# Patient Record
Sex: FEMALE | Race: BLACK OR AFRICAN AMERICAN | NOT HISPANIC OR LATINO | URBAN - METROPOLITAN AREA
[De-identification: names, ages, dates, MRNs, and addresses within clinical notes are randomized per-mention and may not be internally consistent; named-entity substitution may affect disease eponyms.]

---

## 2017-01-13 ENCOUNTER — TELEPHONE (OUTPATIENT)
Dept: FAMILY MEDICINE | Facility: CLINIC | Age: 42
End: 2017-01-13

## 2017-01-13 NOTE — TELEPHONE ENCOUNTER
Panel Management Review      Patient has the following on her problem list: None      Composite cancer screening  Chart review shows that this patient is due/due soon for the following Pap Smear  Summary:    Patient is due/failing the following:   PAP    Action needed:   Patient needs office visit for physical with pap.    Type of outreach:    Phone, left message for patient to call back.  and Sent letter.    Questions for provider review:    None                                                                                                                                    Gregoria Brown MA  9:17 AM 1/13/2017       Chart routed to none .

## 2017-01-13 NOTE — Clinical Note
[address] [address]  [phone]  Dept: [phone]      January 13, 2017      Saba Mackay  [address]    [facility] [address]      Dear Saba Mackay,     At [facility] we care about your health and are committed to providing quality patient care.    Which includes staying current on preventive cancer screenings.  You can increase your chances of finding and treating cancers through regular screenings.      Our records indicate you may be due for the following preventive screening(s):    Cervical Cancer Screening    Pap smear is a screening test used to detect cervical cancer. It is recommended every three years for women 21 and older. The test should be done at least once every three years but women who are at greater risk for cervical cancer may need to have the test more often.    To schedule an appointment or discuss this screening further, you may contact us by phone at the [facility] at [phone] or online through the patient [email or web address]    If you have had any of the screenings listed above at another facility, please call us so that we may update your chart.      Your partners in health,      Quality Committee at [facility]/harm[employer]

## 2017-05-16 ENCOUNTER — TELEPHONE (OUTPATIENT)
Dept: FAMILY MEDICINE | Facility: CLINIC | Age: 42
End: 2017-05-16

## 2017-05-16 NOTE — LETTER
[facility]       [address]  [place] [address]      May 16, 2017      Saba Mackay  [address]    [facility] [id number]    Dear Saba Mackay, [phone]    At [facility] we care about your health and are committed to providing quality patient care, which includes staying current on preventative cancer screenings.  You can increase your chances of finding and treating cancers through regular screenings.      Our records show that you are due for the following screening(s):    Pap Smear for cervical cancer  Recommended every three years for women 21 and older  A Pap test is used to detect cervical cancer.  The test should be taken at least once every three years but women who are at a greater risk for cervical cancer may need to have the test more often.      You are at a greater risk for cervical cancer if:   - You have had a sexually transmitted disease   - You have had more than one sex partner   - You have had an abnormal pap test in the past    You may contact the [facility] at [phone] to schedule the screening test(s) at your earliest convenience.    If you have a My-Chart Account, you also can schedule this appointment through there.    If you have already had one or all of the above screening tests at another facility, please call us so that we may update your chart.      Your partners in health,      Quality Committee   [facility]

## 2017-05-16 NOTE — TELEPHONE ENCOUNTER
Panel Management Review      BP Readings from Last 1 Encounters:   05/17/16 117/75        Fail List measure: pap      Patient is due/failing the following:   PAP    Action needed:   Patient needs office visit for physical.    Type of outreach:    Sent letter.    Questions for provider review:    None                                                                                                                                    Senait Miller MA